# Patient Record
Sex: FEMALE | URBAN - METROPOLITAN AREA
[De-identification: names, ages, dates, MRNs, and addresses within clinical notes are randomized per-mention and may not be internally consistent; named-entity substitution may affect disease eponyms.]

---
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
## 2023-11-27 NOTE — PROCEDURE: DERMAPLANE
Advised pt placard ready.  Pt request it be mailed.  Mailed.  
Pt called requesting placard.  Pt dx emphysema, scoliosis and heart disease and advises he is having difficulty walking far.    
Price (Use Numbers Only, No Special Characters Or $): 50.00
Post-Procedure Instructions: Following the dermaplane procedure, Moisturizer and SPF was applied.
Treatment Areas: face and neck
Detail Level: Zone
Treatment Area Prep: acetone
Pre-Procedure Text: The patient was placed in a recumbant position on the procedure table.
Blade: 10 blade scalpel
Post-Care Instructions: I reviewed with the patient in detail post-care instructions.